# Patient Record
Sex: FEMALE | Race: WHITE
[De-identification: names, ages, dates, MRNs, and addresses within clinical notes are randomized per-mention and may not be internally consistent; named-entity substitution may affect disease eponyms.]

---

## 2017-12-29 LAB
INR PPP: 1 (ref 0.9–1.1)
PTT PATIENT: 22.6 SECONDS (ref 21–31)

## 2017-12-29 NOTE — PAT MEDICATION INSTRUCTIONS
Service Date


Dec 29, 2017.





Current Home Medication List


Aspirin (Aspirin Ec), 2 TABS PO QAM


Cholecalciferol (Vitamin D3), 1 TAB PO QAM


Glipizide (Glipizide Er), 1 TAB PO QPM


Hydrochlorothiazide (Hctz), 50 MG PO QAM


Latanoprost (Xalatan 0.005% Oph Sol), 1 DROPS OPB HS


Lisinopril (Zestril), 40 MG PO QAM


Metformin HCl (Metformin HCl), 1 TAB PO BID


Metoprolol Succ (Toprol Xl) (Toprol-Xl ), 100 MG PO QAM


Multiple Vitamins W/ Minerals (Preservision Areds 2), 1 CAP PO BID


Tramadol (Ultram), 50 MG PO Q8-12H PRN for Pain


Trazodone HCl (Trazodone HCl), 1 TAB PO HS





Medication Instructions


For Your Scheduled Surgery 








- Check with surgeon for instructions: 


Aspirin (Aspirin Ec), 2 TABS PO QAM








- Hold the following medications 48 hours prior to surgery:


Metformin HCl (Metformin HCl), 1 TAB PO BID








- Hold the following medications the morning of surgery:


Cholecalciferol (Vitamin D3), 1 TAB PO QAM


Hydrochlorothiazide (Hctz), 50 MG PO QAM


Lisinopril (Zestril), 40 MG PO QAM


Multiple Vitamins W/ Minerals (Preservision Areds 2), 1 CAP PO BID








- Take the following medications the morning of surgery with a sip of water:


Tramadol (Ultram), 50 MG PO Q8-12H PRN for Pain (okay to take up to 4 hours 

prior to surgery if needed)


Metoprolol Succ (Toprol Xl) (Toprol-Xl ), 100 MG PO QAM


Amlodipine 10mg daily








- Take the following medications as scheduled the night before surgery:


Latanoprost (Xalatan 0.005% Oph Sol), 1 DROPS OPB HS


Trazodone HCl (Trazodone HCl), 1 TAB PO HS


Tramadol (Ultram), 50 MG PO Q8-12H PRN for Pain (if needed)


Glipizide (Glipizide Er), 1 TAB PO QPM








If you have any questions please call us at [phone] or [phone] or 

[phone]

## 2018-01-02 ENCOUNTER — HOSPITAL ENCOUNTER (OUTPATIENT)
Dept: HOSPITAL 45 - C.ACU | Age: 68
Discharge: HOME | End: 2018-01-02
Attending: ORTHOPAEDIC SURGERY
Payer: COMMERCIAL

## 2018-01-02 VITALS — SYSTOLIC BLOOD PRESSURE: 124 MMHG | HEART RATE: 72 BPM | DIASTOLIC BLOOD PRESSURE: 59 MMHG | OXYGEN SATURATION: 93 %

## 2018-01-02 VITALS
HEIGHT: 60.98 IN | WEIGHT: 272.93 LBS | WEIGHT: 272.93 LBS | HEIGHT: 60.98 IN | BODY MASS INDEX: 51.53 KG/M2 | BODY MASS INDEX: 51.53 KG/M2 | BODY MASS INDEX: 51.53 KG/M2

## 2018-01-02 VITALS
DIASTOLIC BLOOD PRESSURE: 59 MMHG | TEMPERATURE: 97.52 F | HEART RATE: 73 BPM | SYSTOLIC BLOOD PRESSURE: 130 MMHG | OXYGEN SATURATION: 93 %

## 2018-01-02 VITALS
TEMPERATURE: 97.52 F | HEART RATE: 74 BPM | DIASTOLIC BLOOD PRESSURE: 69 MMHG | SYSTOLIC BLOOD PRESSURE: 132 MMHG | OXYGEN SATURATION: 93 %

## 2018-01-02 VITALS — SYSTOLIC BLOOD PRESSURE: 170 MMHG | HEART RATE: 79 BPM | TEMPERATURE: 97.7 F | OXYGEN SATURATION: 98 %

## 2018-01-02 DIAGNOSIS — M25.812: ICD-10-CM

## 2018-01-02 DIAGNOSIS — H35.30: ICD-10-CM

## 2018-01-02 DIAGNOSIS — E78.5: ICD-10-CM

## 2018-01-02 DIAGNOSIS — M75.102: Primary | ICD-10-CM

## 2018-01-02 DIAGNOSIS — I12.9: ICD-10-CM

## 2018-01-02 DIAGNOSIS — Z79.82: ICD-10-CM

## 2018-01-02 DIAGNOSIS — F32.9: ICD-10-CM

## 2018-01-02 DIAGNOSIS — Z79.899: ICD-10-CM

## 2018-01-02 DIAGNOSIS — E66.01: ICD-10-CM

## 2018-01-02 DIAGNOSIS — M79.7: ICD-10-CM

## 2018-01-02 DIAGNOSIS — N18.9: ICD-10-CM

## 2018-01-02 DIAGNOSIS — K44.9: ICD-10-CM

## 2018-01-02 DIAGNOSIS — Z79.84: ICD-10-CM

## 2018-01-02 DIAGNOSIS — K21.9: ICD-10-CM

## 2018-01-02 DIAGNOSIS — G47.33: ICD-10-CM

## 2018-01-02 DIAGNOSIS — E11.22: ICD-10-CM

## 2018-01-02 DIAGNOSIS — Z87.891: ICD-10-CM

## 2018-01-02 DIAGNOSIS — D64.9: ICD-10-CM

## 2018-01-02 DIAGNOSIS — M75.22: ICD-10-CM

## 2018-01-02 NOTE — [facility] POST OPERATIVE BRIEF NOTE
Immediate Operative Summary


Operative Date


Jan 2, 2018.





Pre-Operative Diagnosis





rortator cuff tear , left shoulder





Post-Operative Diagnosis





same





Procedure(s) Performed





Left Shoulder: Arthroscopy, 2 cm Rotator Cuff Repair, Biceps Tenotomy , 


Subacromial Decompression





Surgeon


Dr Liu





Assistant Surgeon(s)


none





Estimated Blood Loss


20ml





Findings


biceps tendonopathy/impingement/rotator cuff tear





Fluids (cc crystalloids)


1100cc





Specimens





none





Drains


none





Anesthesia


GET/block





Complication(s)


None





Disposition


Recovery Room / PACU

## 2018-01-02 NOTE — HISTORY & PHYSICAL BRIDGE NOTE
H&P Re-Evaluation


Bridge Note:


I have examined the patient, reviewed the History & Physical and in the 

interval since the performance of the History & Physical I have noted the 

following changes of clinical significance:chart reviewed/consent reviewed. No 

changes noted

## 2018-01-02 NOTE — DISCHARGE INSTRUCTIONS
Discharge Instructions


Date of Service


Jan 2, 2018.





Visit


Reason for Visit:  Left Shoulder Rotator Cuff Tear





Discharge


Discharge Diagnosis / Problem:  same





Discharge Goals


Goal(s):  Decrease discomfort, Improve function, Increase independence





Medications


Stopped Medications Name(s):  


resume all meds as scripts dictate


Restart Stopped Medication(s):


see above





Activity Recommendations


Activity Limitations:  as noted below


Lifting Limitations:  until after follow-up appointment


Exercise/Sports Limitations:  until after follow-up appointment


May Resume Sexual Activity:  when tolerated, after follow-up appointment


Shower/Bathe:  keep incision dry


Driving or Machine Use:  





Anesthesia


.





Post Anesthesia Instructions:





If you have had General Anesthesia or IV Sedation:





*  Do not drive today.


*  Resume driving when surgeon permits.


*  Do not make important decisions or sign legal documents today.


*  Call surgeon for:





   1.  Temperature elevations greater than 101 degrees F.


   2.  Uncontrollable pain.


   3.  Excessive bleeding.


   4.  Persistent nausea and vomiting.


   5.  Medication intolerance (nausea, vomiting or rash).





*  For nausea and vomiting use only clear liquids such as: tea, soda, bouillon 

until nausea subsides, then gradually increase diet as tolerated.





*  If you have any concerns or questions, call your surgeon's office.  If 

physician is unavailable and it is an emergency, call 911 or go to the nearest 

emergency room.





.





Instructions / Follow-Up


Instructions / Follow-Up





The following are instructions to follow after "Shoulder Surgery" including, 


        Acromioplasty, Rotator Cuff Repair and Instability Surgery





ACTIVITY RECOMMENDATIONS:





*  Minimize activity after surgery.





*  No excessive walking, jogging, sports or laboring.





*  Return to activity is individualized depending on the patient and type of 

surgery.





*  Driving is not permitted until at least your first post operative visit.  

Please ask your doctor when it is safe to resume driving.





*  Expect increased discomfort with increased activity.  Continue to ice the 

shoulder as needed.








SCHOOL/WORK RECOMMENDATIONS:





*  You may return to sedentary work or school when you are feeling more 

comfortable.  This is usually 3-7 days after surgery.  








MEDICATIONS:





*  You will have a prescription for pain medication and an anti-inflammatory 

medication after surgery.





*  Use the pain medication for severe pain and the anti-inflammatory for less 

severe pain.  


   Once the pain medication has run out, try to use the anti-inflammatory 

medication.  


   If this is not effective, contact the office [phone] for assistance.





*  The pain medication may cause nausea, constipation and drowsiness.  You 

should see how they affect you before driving or similar activity.





*  The anti-inflammatory medication may cause stomach upset and bleeding.  If 

this occurs let your  doctor know immediately [phone].  





*  Take a stool softener like Colace or a laxative like Senokot to prevent 

constipation.








DIET:





*  Resume previous diet.








SPECIAL CARE:





ICE:  You have the option of an ice cooler, gel packs or ice bags. 





*   If you have an ice cooler, refer to the instructions for that device.  The 

ice cooler may be used continuously.





*  If you do not have an ice cooler, you will need to use ice bags or gel 

packs.  Do not apply ice directly to the skin. 


   Use a thin dressing or juanito shirt between the skin and ice bag.  Apply ice 

for 20-30 minutes and repeat every 2-4 hours.  


   This is especially important for the first 7-10 days after surgery.  Once 

the pain improves, use ice as needed.  





ELEVATION:





*  You may be more comfortable sleeping in an upright position.  Use the sling 

to elevate your arm.





DRESSING:





*  Your dressing will be changed at your first therapy appointment 

approximately 4-5 days after surgery.  


    Band-aids, tape strips or gauze may be applied.  You may then change your 

dressing daily.





*  Reapply dressing followed by the EBIce cooling pad (if chosen) and then the 

sling.  





*  Always wash your hands prior to touching the incision area.





*  Once the stitches are removed, you may leave the wound open to air or cover 

with gauze.





*  Expect some bloody drainage for the first few days after surgery.





*  Leave the tape strips, if present, in place for 5-7 days.





*  Band-aids and gauze may be changed daily.





*  There may be a gauze pad in your armpit area.  This can be changed daily or 

replaced by a dry washcloth.





SLING/BRACE:





*  You will need to use a sling or brace after surgery.  The length of time the 

sling is used is dependent upon the type of surgery performed.





*  Arthroscopic Acromioplasty requires use of the sling for 2-4 weeks for 

comfort.





*  Labral procedures and Rotator Cuff Repairs require use of the sling for a 

longer period of time. 


   Please check with your doctor prior to discontinuing the sling.





BATHING:





*  You may shower or sponge-bathe immediately after surgery.  The post 

operative shoulder  dressing is mostly water-tight.  


   You may shower right over this dressing, but be reasonably careful not to 

get the gauze or incision wet.





*  Once the dressing has been changed on the fourth or fifth day after surgery, 

you may shower and get the incision wet.





*  Wash with regular soap and water.  





*  Do not bathe (submerge the incision), soak, swim or use a hot tub until the 

incision is completely healed over with normal skin and the doctor has given 

the OK to proceed.





*  There is no need to apply any ointments, powders or salves to your incision.





*  Do not apply alcohol or hydrogen peroxide directly to the incision.





*  Diluted peroxide (50:50 mixture with sterile saline) may be used to clean 

dried blood from around the incision area.





THERAPY:





*  You will begin therapy four or five days after surgery.  





*  Organized therapy with the therapist is important for the first 2-4 months 

after surgery depending on the type of procedure. 


    During that time you will attend therapy 1-3 times per week.  





*  You will also need to do daily exercises for range of motion and strength as 

instructed.





*  Patients who have a Capsular Shift Procedure will need to abide by temporary 

range of motion limitations.





*  Patients having Rotator Cuff Surgery are not allowed to actively lift their 

arms until 4-6 weeks after surgery.





*  Please check with your doctor regarding appropriate motion restrictions.








FOLLOW UP VISIT:





*  If not already scheduled, please call the office at [phone] to 

schedule a follow-up appointment for 10 days after surgery and monthly 

thereafter.





Diet Recommendations


Recommended Home Diet:  resume previous diet





Procedures


Procedures Performed:  


see op note





Pending Studies


Studies pending at discharge:  no





Medical Emergencies








.


Who to Call and When:





Medical Emergencies:  If at any time you feel your situation is an emergency, 

please call 911 immediately.





.





Non-Emergent Contact


Non-Emergency issues call your:  Specialist


Call Non-Emergent contact if:  temperature is above 101.5, wound has increased 

drainage, wound has increased redness, wound has increased pain





.


.








"Provider Documentation" section prepared by Dominic Liu.








.

## 2018-01-02 NOTE — OPERATIVE REPORT
DATE OF OPERATION:  01/02/2018

 

PREOPERATIVE DIAGNOSIS:  Left shoulder rotator cuff tear, biceps tendinopathy

and impingement.

 

POSTOPERATIVE DIAGNOSIS:  Same.

 

OPERATION PERFORMED:

1. Exam under anesthesia.

2. Diagnostic arthroscopy.

3. Arthroscopic biceps tenotomy.

4. Arthroscopic subacromial decompression with bursectomy.

5. Arthroscopic rotator cuff repair 2 cm tear.

 

SURGEON:  Dr. Liu.

 

ASSISTANT:  None.  No resident or fellow available.  No PA available.

 

PERIOPERATIVE SITUATION:  Medically cleared female with intractable shoulder

pain.  Physical exam, x-ray and MRI scan consistent with the above diagnosis.

 She has failed conservative management.  She wants to proceed with surgical

treatment.

 

OPERATION AND FINDINGS: 

 

PROCEDURE:  The patient appropriately identified, site verified, consent

verified, 900 mg clindamycin confirmed as being given.  The shoulder was

examined revealing no instability.  She was then sterilely placed in a beach

chair position.  She is a large person so everything was appropriately

padded.

 

The arm was prepped and draped in usual routine fashion.  Arthroscopy

commenced through a posterior portal made  roughly 2.5 cm medial and inferior

to posterolateral tip of the acromion.  Landmarks were hard to feel.  Joint

was entered without difficulty.  An anterior portal made just off the edge of

the AC joint.  The joint entered.  Immediately encountered was a high grade

partial thickness tear of the biceps long head with a large flap and  large

tenosynovitis in the shoulder joint.  This was released and then debrided. 

The intraarticular synovitis was then debrided, the subscapularis was intact.

 Rotator cuff tear was identified.  The posterior cuff was intact, it was the

 anterior cuff supraspinatus.  There was a large amount of tendinopathy which

was debrided.  The subacromial space was then entered.  The 2 accessory

anterior lateral portals made with needles, they were then enlarged and 

cannulas placed.  Bursectomy was completed.  The rotator cuff tear was then

debrided, it was then mobilized.  Three FiberLink sutures were placed and one

4.75 anchor placed and repaired the cuff back down into the bed.  It was

quite mobile and repaired nicely.  Tissue quality was reasonable.

 

The cuff tear was then repaired from the lateral side, it looked good.  The

procedure was then terminated.  All instruments and fluid removed.  The

portals closed with 3-0 nylon suture, dressed with Xeroform, 4 x 4 gauze, ABD

pads, Ioban dressing and shoulder immobilizer placed.  Estimated blood loss

was roughly 20 mL.  Crystalloid roughly 1100 mL.  DVT prophylaxis with SCDs

during the case.

 

 

I attest to the content of the Intraoperative Record and any orders documented therein. Any exception
s are noted below.

## 2018-01-02 NOTE — ANESTHESIOLOGY PROGRESS NOTE
Anesthesia Post Op Note


Date & Time


Jan 2, 2018 at 12:41





Vital Signs


Pain Intensity:  0





Vital Signs Past 12 Hours








  Date Time  Temp Pulse Resp B/P (MAP) Pulse Ox O2 Delivery O2 Flow Rate FiO2


 


1/2/18 12:30  74 16 150/75 100 Nasal Cannula 2 


 


1/2/18 12:20  75 15 150/69 98 Nasal Cannula 3 


 


1/2/18 12:10 36 85 16 158/78 98 Nasal Cannula 3 


 


1/2/18 09:33 36.5 79 18 170/ (56) 98 Room Air  











Notes


Mental Status:  alert / awake / arousable, participated in evaluation


Pt Amnestic to Procedure:  Yes


Nausea / Vomiting:  adequately controlled


Pain:  adequately controlled


Airway Patency, RR, SpO2:  stable & adequate


BP & HR:  stable & adequate


Hydration State:  stable & adequate


Anesthetic Complications:  no major complications apparent

## 2018-02-12 ENCOUNTER — HOSPITAL ENCOUNTER (OUTPATIENT)
Dept: HOSPITAL 45 - C.RDSM | Age: 68
Discharge: HOME | End: 2018-02-12
Attending: ORTHOPAEDIC SURGERY
Payer: COMMERCIAL

## 2018-02-12 DIAGNOSIS — M75.122: Primary | ICD-10-CM
